# Patient Record
Sex: FEMALE | Race: WHITE | Employment: OTHER | URBAN - METROPOLITAN AREA
[De-identification: names, ages, dates, MRNs, and addresses within clinical notes are randomized per-mention and may not be internally consistent; named-entity substitution may affect disease eponyms.]

---

## 2018-11-27 ENCOUNTER — HOSPITAL ENCOUNTER (EMERGENCY)
Facility: CLINIC | Age: 63
Discharge: HOME | End: 2018-11-28
Attending: EMERGENCY MEDICINE | Admitting: EMERGENCY MEDICINE
Payer: MEDICARE

## 2018-11-27 ENCOUNTER — APPOINTMENT (OUTPATIENT)
Dept: GENERAL RADIOLOGY | Facility: CLINIC | Age: 63
End: 2018-11-27
Attending: EMERGENCY MEDICINE
Payer: MEDICARE

## 2018-11-27 DIAGNOSIS — S83.101A SUBLUXATION OF RIGHT KNEE, INITIAL ENCOUNTER: ICD-10-CM

## 2018-11-27 PROCEDURE — 96374 THER/PROPH/DIAG INJ IV PUSH: CPT

## 2018-11-27 PROCEDURE — 73562 X-RAY EXAM OF KNEE 3: CPT | Mod: RT

## 2018-11-27 PROCEDURE — 99285 EMERGENCY DEPT VISIT HI MDM: CPT | Mod: 25

## 2018-11-27 PROCEDURE — 25000128 H RX IP 250 OP 636: Performed by: EMERGENCY MEDICINE

## 2018-11-27 RX ORDER — HYDROMORPHONE HYDROCHLORIDE 1 MG/ML
0.5 INJECTION, SOLUTION INTRAMUSCULAR; INTRAVENOUS; SUBCUTANEOUS ONCE
Status: COMPLETED | OUTPATIENT
Start: 2018-11-27 | End: 2018-11-27

## 2018-11-27 RX ADMIN — HYDROMORPHONE HYDROCHLORIDE 0.5 MG: 1 INJECTION, SOLUTION INTRAMUSCULAR; INTRAVENOUS; SUBCUTANEOUS at 23:42

## 2018-11-27 ASSESSMENT — ENCOUNTER SYMPTOMS: ARTHRALGIAS: 1

## 2018-11-27 NOTE — ED AVS SNAPSHOT
Emergency Department    [address] [facility] [address]    Phone:  [phone]    Fax:  [phone]                                       Darline Heath   MRN: [id number]    Department:   Emergency Department   Date of Visit:  11/27/2018           Patient Information     Date Of Birth          1955        Your diagnoses for this visit were:     Subluxation of right knee, initial encounter        You were seen by Mo Whitney MD.      Follow-up Information     Call Your Orthopedic Doctor.        Follow up with  Emergency Department.    Specialty:  EMERGENCY MEDICINE    Why:  As needed, If symptoms worsen    Contact information:    [address] [facility] [address] [phone]        Discharge Instructions       You were seen for pain in the right knee.  The exact cause of the pain is not clear, but after sedation and range of motion, the symptoms have resolved.  Please follow-up with your orthopedic doctor for further evaluation and continued care and return to the ER if you develop any further worsening pain, numbness, weakness, or any other concerns.    24 Hour Appointment Hotline       To make an appointment at any [facility] call [phone] ([phone]). If you don't have a family doctor or clinic, we will help you find one. [facility] clinics are conveniently located to serve the needs of you and your family.             Review of your medicines      Notice     You have not been prescribed any medications.            Procedures and tests performed during your visit     XR Knee Right 3 Views      Orders Needing Specimen Collection     None      Pending Results     Date and Time Order Name Status Description    11/27/2018 2314 XR Knee Right 3 Views Preliminary             Pending Culture Results     No orders found for last 3 day(s).            Pending Results Instructions     If you had any lab results that were not finalized at the time of  your Discharge, you can call the ED Lab Result RN at [phone]. You will be contacted by this team for any positive Lab results or changes in treatment. The nurses are available 7 days a week from 10A to 6:30P.  You can leave a message 24 hours per day and they will return your call.        Test Results From Your Hospital Stay        11/28/2018  1:29 AM      Narrative     RIGHT KNEE 3 VIEWS  11/28/2018 12:13 AM     HISTORY: Knee pain. Concern for dislocation.    COMPARISON: None.        Impression     IMPRESSION:  1. No convincing acute fracture or malalignment of the right knee.  2. No right knee joint effusion.                Clinical Quality Measure: Blood Pressure Screening     Your blood pressure was checked while you were in the emergency department today. The last reading we obtained was  BP: 134/90 . Please read the guidelines below about what these numbers mean and what you should do about them.  If your systolic blood pressure (the top number) is less than 120 and your diastolic blood pressure (the bottom number) is less than 80, then your blood pressure is normal. There is nothing more that you need to do about it.  If your systolic blood pressure (the top number) is 120-139 or your diastolic blood pressure (the bottom number) is 80-89, your blood pressure may be higher than it should be. You should have your blood pressure rechecked within a year by a primary care provider.  If your systolic blood pressure (the top number) is 140 or greater or your diastolic blood pressure (the bottom number) is 90 or greater, you may have high blood pressure. High blood pressure is treatable, but if left untreated over time it can put you at risk for heart attack, stroke, or kidney failure. You should have your blood pressure rechecked by a primary care provider within the next 4 weeks.  If your provider in the emergency department today gave you specific instructions to follow-up with your doctor or provider even  "sooner than that, you should follow that instruction and not wait for up to 4 weeks for your follow-up visit.        Thank you for choosing [facility]       Thank you for choosing [facility] for your care. Our goal is always to provide you with excellent care. Hearing back from our patients is one way we can continue to improve our services. Please take a few minutes to complete the written survey that you may receive in the mail after you visit with us. Thank you!        [employer] Information     [employer] lets you send messages to your doctor, view your test results, renew your prescriptions, schedule appointments and more. To sign up, go to [email or web address] . Click on \"Log in\" on the left side of the screen, which will take you to the Welcome page. Then click on \"Sign up Now\" on the right side of the page.     You will be asked to enter the access code listed below, as well as some personal information. Please follow the directions to create your username and password.     Your access code is: [email or web address]  Expires: 2019  2:28 AM     Your access code will  in 90 days. If you need help or a new code, please call your [facility] clinic or [phone].        Care EveryWhere ID     This is your Care EveryWhere ID. This could be used by other organizations to access your [facility] medical records  [id number]        Equal Access to Services     SHENG SCHWAB : Hadii andrew Regalado, waaxda luqadaha, qaybta kaalmada lesly, scottie gomez. So [facility] [phone].    ATENCIÓN: Si habla español, tiene a ok disposición servicios gratuitos de asistencia lingüística. Kev al [phone].    We comply with applicable federal civil rights laws and [place] laws. We do not discriminate on the basis of race, color, national origin, age, disability, sex, sexual orientation, or gender identity.            After Visit Summary       This is your record. Keep this with you and show to " your community pharmacist(s) and doctor(s) at your next visit.

## 2018-11-27 NOTE — ED AVS SNAPSHOT
Emergency Department    [address] [place] [address]    Phone:  [phone]    Fax:  [phone]                                       Darline Heath   MRN: [id number]    Department:   Emergency Department   Date of Visit:  11/27/2018           After Visit Summary Signature Page     I have received my discharge instructions, and my questions have been answered. I have discussed any challenges I see with this plan with the nurse or doctor.    ..........................................................................................................................................  Patient/Patient Representative Signature      ..........................................................................................................................................  Patient Representative Print Name and Relationship to Patient    ..................................................               ................................................  Date                                   Time    ..........................................................................................................................................  Reviewed by Signature/Title    ...................................................              ..............................................  Date                                               Time          22EPIC Rev 08/18

## 2018-11-28 VITALS
TEMPERATURE: 97.7 F | SYSTOLIC BLOOD PRESSURE: 134 MMHG | HEART RATE: 75 BPM | WEIGHT: 121 LBS | DIASTOLIC BLOOD PRESSURE: 90 MMHG | BODY MASS INDEX: 20.16 KG/M2 | OXYGEN SATURATION: 100 % | HEIGHT: 65 IN | RESPIRATION RATE: 17 BRPM

## 2018-11-28 PROCEDURE — 25000128 H RX IP 250 OP 636

## 2018-11-28 PROCEDURE — 27752 TREATMENT OF TIBIA FRACTURE: CPT | Mod: RT

## 2018-11-28 PROCEDURE — 25000128 H RX IP 250 OP 636: Performed by: EMERGENCY MEDICINE

## 2018-11-28 PROCEDURE — 96376 TX/PRO/DX INJ SAME DRUG ADON: CPT | Mod: 59

## 2018-11-28 PROCEDURE — 27560 TREAT KNEECAP DISLOCATION: CPT | Mod: RT

## 2018-11-28 PROCEDURE — 99152 MOD SED SAME PHYS/QHP 5/>YRS: CPT

## 2018-11-28 RX ORDER — HYDROMORPHONE HYDROCHLORIDE 1 MG/ML
0.5 INJECTION, SOLUTION INTRAMUSCULAR; INTRAVENOUS; SUBCUTANEOUS ONCE
Status: COMPLETED | OUTPATIENT
Start: 2018-11-28 | End: 2018-11-28

## 2018-11-28 RX ORDER — HYDROMORPHONE HYDROCHLORIDE 1 MG/ML
0.5 INJECTION, SOLUTION INTRAMUSCULAR; INTRAVENOUS; SUBCUTANEOUS ONCE
Status: DISCONTINUED | OUTPATIENT
Start: 2018-11-28 | End: 2018-11-28 | Stop reason: HOSPADM

## 2018-11-28 RX ORDER — ACETAMINOPHEN 500 MG
1000 TABLET ORAL ONCE
Status: DISCONTINUED | OUTPATIENT
Start: 2018-11-28 | End: 2018-11-28 | Stop reason: HOSPADM

## 2018-11-28 RX ORDER — PROPOFOL 10 MG/ML
INJECTION, EMULSION INTRAVENOUS
Status: COMPLETED
Start: 2018-11-28 | End: 2018-11-28

## 2018-11-28 RX ADMIN — HYDROMORPHONE HYDROCHLORIDE 0.5 MG: 1 INJECTION, SOLUTION INTRAMUSCULAR; INTRAVENOUS; SUBCUTANEOUS at 00:18

## 2018-11-28 RX ADMIN — PROPOFOL 60 MG: 10 INJECTION, EMULSION INTRAVENOUS at 02:10

## 2018-11-28 NOTE — ED NOTES
Bed: ED24  Expected date:   Expected time:   Means of arrival:   Comments:  [id number] Dislocated knee.

## 2018-11-28 NOTE — ED PROVIDER NOTES
"  History     Chief Complaint:  Knee Dislocation    HPI   Christy Heath is a 62 year old female with history of negro-danlos syndrome, osteoporosis, and hypothyroidism who presents for evaluation of right knee pain. Two hours ago, she was laying on her couch when she picked up her right leg to change position and reports she felt her knee dislocate. She notes this has happened about 6 times in the past, and each time she has been able to \"put it back\" in place on her own, but today was unable to reduce it. She states it typically dislocates in non-weight bearing situations, and her lower leg \"goes out to the side\". She had her family member (rehab aide) attempt to reduce the knee as well, and she also had no luck. The patient presents via ambulance and received 100 mcg of fentanyl en route. Of note, she has seen orthopedics following her most recent dislocation, and after imaging was found to have a torn meniscus in the right leg.     Allergies:  Nsaids  Novacaine    Medications:    The patient is not currently taking any prescribed medications.     Past Medical History:    Negro-danlos syndrome  Osteoporosis  Hypothyroidism    Past Surgical History:    The patient does not have any pertinent past surgical history.     Family History:    No past pertinent family history.     Social History:  Tobacco use: No  Smokeless tobacco: current user  Alcohol use: No  The patient presents with family.      Review of Systems   Respiratory: Negative for shortness of breath.    Cardiovascular: Negative for chest pain.   Musculoskeletal: Positive for arthralgias (right knee pain).   Neurological: Negative for weakness and numbness.   All other systems reviewed and are negative.    Physical Exam     Patient Vitals for the past 24 hrs:   BP Temp Temp src Pulse Heart Rate Resp SpO2 Height Weight   11/28/18 0211 134/90 - - - 74 17 100 % - -   11/28/18 0208 120/82 - - - - - - - -   11/28/18 0200 - - - - 75 21 100 % - -   11/27/18 " "2309 (!) 143/92 97.7  F (36.5  C) Oral 75 - 18 99 % 1.651 m (5' 5\") 54.9 kg (121 lb)        Physical Exam  General: Appears uncomfortable  Head: No signs of trauma.   CV: Normal rate and regular rhythm.    Resp: Effort normal and breath sounds normal. No respiratory distress.   GI: Soft. There is no tenderness.  No rebound or guarding.  Normal bowel sounds.   MSK: Pain with any movement of right knee. No clear deformities to knee or elsewhere.  +neurovascularly intact distally with 2+ DP pulses.    Neuro: The patient is alert and oriented.  Strength in upper/lower extremities normal and symmetrical.   Sensation normal. Speech normal.  GCS 15  Skin: Skin is warm and dry. No rash noted.   Psych: normal mood and affect. behavior is normal.       Emergency Department Course     Imaging:  Radiology findings were communicated with the patient and family who voiced understanding of the findings.    XR Knee Right 3 Views  IMPRESSION:  1. No convincing acute fracture or malalignment of the right knee.   2. No right knee joint effusion.   Reading per radiology.     Procedures:    [facility] Procedure Note      Sedation:      Performed by: Mo Whitney MD  Authorized by: Mo Whitney MD    Pre-Procedure Assessment done at 0130.    Expected Level:  Deep Sedation    Indication:  Sedation is required to allow for joint reduction    Consent obtained from patient after discussing the risks, benefits and alternatives.    PO Intake:  Appropriately NPO for procedure    ASA Class:  Class 1 - HEALTHY PATIENT    Mallampati:  Grade 1:  Soft palate, uvula, tonsillar pillars, and posterior pharyngeal wall visible    Lungs: Lungs Clear with good breath sounds bilaterally.     Heart: Normal heart sounds and rate    History and physical reviewed and no updates needed. I have reviewed the lab findings, diagnostic data, medications, and the plan for sedation. I have determined this patient to be an appropriate candidate for " the planned sedation and procedure and have reassessed the patient IMMEDIATELY PRIOR to sedation and procedure.    Sedation Post Procedure Summary:    Prior to the start of the procedure and with procedural staff participation, I verbally confirmed the patient s identity using two indicators, relevant allergies, that the procedure was appropriate and matched the consent or emergent situation, and that the correct equipment/implants were available. Immediately prior to starting the procedure I conducted the Time Out with the procedural staff and re-confirmed the patient s name, procedure, and site/side. (The Joint Commission universal protocol was followed.)  Yes      Sedatives: Propofol    Vital signs, airway, End Tidal CO2 and pulse oximetry were monitored and remained stable throughout the procedure and sedation was maintained until the procedure was complete.  The patient was monitored by staff until sedation discharge criteria were met.    Patient tolerance: Patient tolerated the procedure well with no immediate complications.    Time of sedation in minutes:  25 minutes from beginning to end of physician one to one monitoring.      Procedure Note: Reduction of Subluxation  Physician: Mo Whitney MD  Diagnosis: knee subluxation  Consent: Informed written, see paper chart  Description of Procedure: Consent as above.  Patient positioned in supine position.  Procedural anesthesia was utilized, see above note.  The leg was put through its full ROM without difficulty.   The neurovascular exam was normal before and after reduction attempt.  The patient tolerated the procedure well, there were no complications.          Interventions:  2342 Dilaudid injection 0.5 mg IV  0018 Dilaudid injection 0.5 mg IV  0210 Propofol injection 60 mg IV     Emergency Department Course:  Nursing notes and vitals reviewed.  The patient was sent for a XR while in the emergency department, results above.      2310 I performed an exam  of the patient as documented above.   0130 I updated the patient.   0154 Patient moved to stabilization room for procedure.   0156 Sedation, and joint reduction procedure started.   0220 End of physician one-to-one monitoring.     Findings and plan explained to the patient. Patient discharged home with instructions regarding supportive care, medications, and reasons to return. The importance of close follow-up was reviewed.      I personally reviewed the imaging results with the patient and answered all related questions prior to discharge.    Impression & Plan      Medical Decision Making:  Darline Heath is a 62-year-old woman who presents due to knee pain.  She has a history of Negro-Danlos syndrome and has frequent dislocations and subluxations.  She had onset of pain to the right knee consistent with what sounds be subluxations that she has had a number of times in the past however she was able unable to improve it herself, she came to the ER.  On my evaluation, there is no obvious bony deformity or displacement that I could visualize, although the patient did appear to be in a fair amount of discomfort particularly with any movement of the knee.  She was neurovascularly intact distally.  I did obtain an x-ray which did not show any clear signs of dislocation or other malalignment.  Ultimately given the patient's continued pain and concern, I did agree to do a sedation after discussion of the risks and benefits and placed the knee through range of motion.  Following this, the patient felt considerably better.  Given there is no traumatic injury and no significant displacement, I do not feel that a CTA was necessary, but I did discuss the option to do it with the patient.  She agreed in not obtaining at this time.  Also discussed doing post range of motion x-rays, but the patient also felt this was unnecessary.  Patient was ultimately discharged home with recommendation to follow-up with her orthopedic doctor for  continued evaluation and instructions to return for any further concerns.      Diagnosis:    ICD-10-CM    1. Subluxation of right knee, initial encounter S83.101A         Disposition:   Discharged to home      Discharge Medications:  There are no discharge medications for this patient.      Scribe Disclosure:  I, Yoli Melendrez, am serving as a scribe at 11:09 PM on 11/27/2018 to document services personally performed by Mo Whitney MD, based on my observations and the provider's statements to me.     Yoli Melendrez  11/27/2018    EMERGENCY DEPARTMENT       Mo Whitney MD  12/04/18 1042

## 2018-11-28 NOTE — DISCHARGE INSTRUCTIONS
You were seen for pain in the right knee.  The exact cause of the pain is not clear, but after sedation and range of motion, the symptoms have resolved.  Please follow-up with your orthopedic doctor for further evaluation and continued care and return to the ER if you develop any further worsening pain, numbness, weakness, or any other concerns.

## 2018-12-04 ASSESSMENT — ENCOUNTER SYMPTOMS
SHORTNESS OF BREATH: 0
NUMBNESS: 0
WEAKNESS: 0